# Patient Record
Sex: FEMALE | Race: BLACK OR AFRICAN AMERICAN | Employment: OTHER | URBAN - METROPOLITAN AREA
[De-identification: names, ages, dates, MRNs, and addresses within clinical notes are randomized per-mention and may not be internally consistent; named-entity substitution may affect disease eponyms.]

---

## 2017-11-21 PROBLEM — J96.00 ACUTE RESPIRATORY FAILURE (HCC): Status: ACTIVE | Noted: 2017-11-21

## 2017-11-22 ENCOUNTER — TELEPHONE (OUTPATIENT)
Dept: PULMONOLOGY | Age: 72
End: 2017-11-22

## 2017-11-22 NOTE — TELEPHONE ENCOUNTER
Phoned pt to schedule hospital f/u appt the week of February 19th. Patient will have her daughter call us to schedule appt. Patient stated that daughter is the one who brings her to appts so it will have to be on her schedule.

## 2017-11-22 NOTE — TELEPHONE ENCOUNTER
----- Message from Owen Bah MD sent at 11/22/2017  9:24 AM EST -----  Please schedule f/u with us in 1-2 months. Thanks.

## 2017-12-14 ENCOUNTER — OFFICE VISIT (OUTPATIENT)
Dept: PULMONOLOGY | Age: 72
End: 2017-12-14

## 2017-12-14 VITALS
HEIGHT: 64 IN | BODY MASS INDEX: 38.07 KG/M2 | OXYGEN SATURATION: 94 % | RESPIRATION RATE: 16 BRPM | WEIGHT: 223 LBS | DIASTOLIC BLOOD PRESSURE: 62 MMHG | HEART RATE: 69 BPM | SYSTOLIC BLOOD PRESSURE: 110 MMHG

## 2017-12-14 DIAGNOSIS — J44.9 MODERATE COPD (CHRONIC OBSTRUCTIVE PULMONARY DISEASE) (HCC): Primary | ICD-10-CM

## 2017-12-14 DIAGNOSIS — Z12.2 ENCOUNTER FOR SCREENING FOR LUNG CANCER: ICD-10-CM

## 2017-12-14 DIAGNOSIS — F17.200 TOBACCO USE DISORDER: ICD-10-CM

## 2017-12-14 DIAGNOSIS — J42 CHRONIC BRONCHITIS, UNSPECIFIED CHRONIC BRONCHITIS TYPE (HCC): ICD-10-CM

## 2017-12-14 PROCEDURE — 3023F SPIROM DOC REV: CPT | Performed by: INTERNAL MEDICINE

## 2017-12-14 PROCEDURE — 1123F ACP DISCUSS/DSCN MKR DOCD: CPT | Performed by: INTERNAL MEDICINE

## 2017-12-14 PROCEDURE — G8427 DOCREV CUR MEDS BY ELIG CLIN: HCPCS | Performed by: INTERNAL MEDICINE

## 2017-12-14 PROCEDURE — 1090F PRES/ABSN URINE INCON ASSESS: CPT | Performed by: INTERNAL MEDICINE

## 2017-12-14 PROCEDURE — G8484 FLU IMMUNIZE NO ADMIN: HCPCS | Performed by: INTERNAL MEDICINE

## 2017-12-14 PROCEDURE — 3014F SCREEN MAMMO DOC REV: CPT | Performed by: INTERNAL MEDICINE

## 2017-12-14 PROCEDURE — G8400 PT W/DXA NO RESULTS DOC: HCPCS | Performed by: INTERNAL MEDICINE

## 2017-12-14 PROCEDURE — 3017F COLORECTAL CA SCREEN DOC REV: CPT | Performed by: INTERNAL MEDICINE

## 2017-12-14 PROCEDURE — 4040F PNEUMOC VAC/ADMIN/RCVD: CPT | Performed by: INTERNAL MEDICINE

## 2017-12-14 PROCEDURE — G8417 CALC BMI ABV UP PARAM F/U: HCPCS | Performed by: INTERNAL MEDICINE

## 2017-12-14 PROCEDURE — 99214 OFFICE O/P EST MOD 30 MIN: CPT | Performed by: INTERNAL MEDICINE

## 2017-12-14 PROCEDURE — G8926 SPIRO NO PERF OR DOC: HCPCS | Performed by: INTERNAL MEDICINE

## 2017-12-14 PROCEDURE — 4004F PT TOBACCO SCREEN RCVD TLK: CPT | Performed by: INTERNAL MEDICINE

## 2017-12-14 PROCEDURE — 1111F DSCHRG MED/CURRENT MED MERGE: CPT | Performed by: INTERNAL MEDICINE

## 2017-12-14 RX ORDER — BUPROPION HYDROCHLORIDE 150 MG/1
150 TABLET ORAL
COMMUNITY
Start: 2017-12-12 | End: 2018-01-09

## 2017-12-14 ASSESSMENT — ENCOUNTER SYMPTOMS
ABDOMINAL DISTENTION: 0
WHEEZING: 0
COUGH: 0
SHORTNESS OF BREATH: 1
CHEST TIGHTNESS: 0
EYE REDNESS: 0

## 2017-12-14 NOTE — LETTER
[address]  [address] [facility]  [address] [place] [address]  Phone: [phone]  Fax: [phone]    Edvin Benedict MD        December 14, 2017     [facility] [address]  [place], [address]    Patient: Derrick Engle   MR Number: [id number]   YOB: 1945   Date of Visit: 12/14/2017       Dear Dr. Crisostomo Cough: Thank you for referring Dwain Pathak to me for evaluation. Below are the relevant portions of my assessment and plan of care. If you have questions, please do not hesitate to call me. I look forward to following Aura Meeks along with you.     Sincerely,        Edvin Benedict MD

## 2017-12-14 NOTE — PROGRESS NOTES
[facility] Pulmonary and Critical Care    Outpatient Note    Subjective:   CHIEF COMPLAINT:   Chief Complaint   Patient presents with    Follow-Up from [facility] Other     pt started back smoking        HPI:     The patient is 71 y.o. female who presents today for Hospital follow up on COPD. She is known to have COPD and was admitted to the hospital on 11/21/17 for COPD exacerbation. On initial presentation she had VBG with pH of 7.2 and PCO2 of 71. She was treated with Solu-Medrol and azithromycin in addition to doing and breathing treatments. Repeat ABG was within normal pH and normal CO2. (PH 7.37 PCO2 41). Unfortunately she started smoking again after discharge. Currently she denied any cough or shortness of breath. She is using the [facility] twice a day. She is not requiring to use DuoNeb's. Past Medical History:    Past Medical History:   Diagnosis Date    Callus of foot     Depression     Fibromyalgia     Hypertension        Social History:    History   Smoking Status    Current Every Day Smoker    Packs/day: 1.00    Years: 30.00    Types: Cigarettes    Last attempt to quit: 6/9/2013   Smokeless Tobacco    Never Used       Family History:  Family History   Problem Relation Age of Onset    Hypertension Father     Stroke Father     Hypertension Mother     Stroke Mother     Hypertension Sister     Diabetes Sister     Cancer Sister     Hypertension Brother     Diabetes Brother     Stroke Brother     Cancer Brother        Current Medications:  Current Outpatient Prescriptions on File Prior to Visit   Medication Sig Dispense Refill    levETIRAcetam (KEPPRA) 1000 MG tablet Take 2 tablets by mouth 2 times daily 60 tablet 3    DULoxetine (CYMBALTA) 60 MG extended release capsule Take 1 capsule by mouth nightly 30 capsule 2    lacosamide (VIMPAT) 50 MG TABS tablet Take 1 tablet by mouth 2 times daily .  60 tablet 2    ipratropium-albuterol (DUONEB) 0.5-2.5 (3) MG/3ML for smoking cessation    Spent over 25 minutes on this visit including history, physical exam, review of data, development and implementation of treatment plan and coordination of care  Over 50% of time spent in pt counseling and education.

## 2017-12-19 RX ORDER — MOMETASONE FUROATE AND FORMOTEROL FUMARATE DIHYDRATE 200; 5 UG/1; UG/1
AEROSOL RESPIRATORY (INHALATION)
Qty: 39 G | Refills: 5 | Status: SHIPPED | OUTPATIENT
Start: 2017-12-19

## 2018-01-09 ENCOUNTER — OFFICE VISIT (OUTPATIENT)
Dept: PULMONOLOGY | Age: 73
End: 2018-01-09

## 2018-01-09 VITALS
HEIGHT: 64 IN | BODY MASS INDEX: 38.07 KG/M2 | HEART RATE: 60 BPM | OXYGEN SATURATION: 98 % | WEIGHT: 223 LBS | RESPIRATION RATE: 16 BRPM

## 2018-01-09 DIAGNOSIS — J44.9 MODERATE COPD (CHRONIC OBSTRUCTIVE PULMONARY DISEASE) (HCC): Primary | ICD-10-CM

## 2018-01-09 DIAGNOSIS — F17.200 TOBACCO USE DISORDER: ICD-10-CM

## 2018-01-09 PROCEDURE — 1090F PRES/ABSN URINE INCON ASSESS: CPT | Performed by: INTERNAL MEDICINE

## 2018-01-09 PROCEDURE — 3014F SCREEN MAMMO DOC REV: CPT | Performed by: INTERNAL MEDICINE

## 2018-01-09 PROCEDURE — 3023F SPIROM DOC REV: CPT | Performed by: INTERNAL MEDICINE

## 2018-01-09 PROCEDURE — 1123F ACP DISCUSS/DSCN MKR DOCD: CPT | Performed by: INTERNAL MEDICINE

## 2018-01-09 PROCEDURE — G8400 PT W/DXA NO RESULTS DOC: HCPCS | Performed by: INTERNAL MEDICINE

## 2018-01-09 PROCEDURE — G8926 SPIRO NO PERF OR DOC: HCPCS | Performed by: INTERNAL MEDICINE

## 2018-01-09 PROCEDURE — 3017F COLORECTAL CA SCREEN DOC REV: CPT | Performed by: INTERNAL MEDICINE

## 2018-01-09 PROCEDURE — 99214 OFFICE O/P EST MOD 30 MIN: CPT | Performed by: INTERNAL MEDICINE

## 2018-01-09 PROCEDURE — 4040F PNEUMOC VAC/ADMIN/RCVD: CPT | Performed by: INTERNAL MEDICINE

## 2018-01-09 PROCEDURE — 4004F PT TOBACCO SCREEN RCVD TLK: CPT | Performed by: INTERNAL MEDICINE

## 2018-01-09 PROCEDURE — G8484 FLU IMMUNIZE NO ADMIN: HCPCS | Performed by: INTERNAL MEDICINE

## 2018-01-09 PROCEDURE — G8427 DOCREV CUR MEDS BY ELIG CLIN: HCPCS | Performed by: INTERNAL MEDICINE

## 2018-01-09 PROCEDURE — G8417 CALC BMI ABV UP PARAM F/U: HCPCS | Performed by: INTERNAL MEDICINE

## 2018-01-09 ASSESSMENT — ENCOUNTER SYMPTOMS
ABDOMINAL DISTENTION: 0
CHEST TIGHTNESS: 0
EYE REDNESS: 0
WHEEZING: 0
SHORTNESS OF BREATH: 1
COUGH: 0

## 2018-01-09 NOTE — PROGRESS NOTES
extensively for smoking cessation. discontinue Wellbutrin  okay to use nicotine patches. Advised to start with 21 mg for one week then 14 mg for 1 week then 7 mg for one week   get PFT   get low-dose CT   encouraged to participate in pulmonary rehab   up-to-date on immunization    Spent over 25 minutes on this visit including history, physical exam, review of data, development and implementation of treatment plan and coordination of care  Over 50% of time spent in pt counseling and education.

## 2019-05-10 ENCOUNTER — ANESTHESIA (OUTPATIENT)
Dept: ENDOSCOPY | Age: 74
End: 2019-05-10
Payer: MEDICARE

## 2019-05-10 ENCOUNTER — HOSPITAL ENCOUNTER (OUTPATIENT)
Age: 74
Setting detail: OUTPATIENT SURGERY
Discharge: HOME | End: 2019-05-10
Attending: INTERNAL MEDICINE | Admitting: INTERNAL MEDICINE
Payer: MEDICARE

## 2019-05-10 ENCOUNTER — ANESTHESIA EVENT (OUTPATIENT)
Dept: ENDOSCOPY | Age: 74
End: 2019-05-10
Payer: MEDICARE

## 2019-05-10 VITALS
SYSTOLIC BLOOD PRESSURE: 102 MMHG | BODY MASS INDEX: 39.51 KG/M2 | HEART RATE: 69 BPM | HEIGHT: 63 IN | WEIGHT: 223 LBS | RESPIRATION RATE: 21 BRPM | TEMPERATURE: 98.6 F | DIASTOLIC BLOOD PRESSURE: 79 MMHG | OXYGEN SATURATION: 95 %

## 2019-05-10 VITALS — DIASTOLIC BLOOD PRESSURE: 77 MMHG | OXYGEN SATURATION: 99 % | SYSTOLIC BLOOD PRESSURE: 123 MMHG | TEMPERATURE: 96.8 F

## 2019-05-10 PROCEDURE — 88305 TISSUE EXAM BY PATHOLOGIST: CPT

## 2019-05-10 PROCEDURE — 3609012400 HC EGD TRANSORAL BIOPSY SINGLE/MULTIPLE: Performed by: INTERNAL MEDICINE

## 2019-05-10 PROCEDURE — 6360000002 HC RX W HCPCS: Performed by: NURSE ANESTHETIST, CERTIFIED REGISTERED

## 2019-05-10 PROCEDURE — 7100000010 HC PHASE II RECOVERY - FIRST 15 MIN: Performed by: INTERNAL MEDICINE

## 2019-05-10 PROCEDURE — 6360000002 HC RX W HCPCS: Performed by: ANESTHESIOLOGY

## 2019-05-10 PROCEDURE — 94664 DEMO&/EVAL PT USE INHALER: CPT

## 2019-05-10 PROCEDURE — 3700000000 HC ANESTHESIA ATTENDED CARE: Performed by: INTERNAL MEDICINE

## 2019-05-10 PROCEDURE — 2709999900 HC NON-CHARGEABLE SUPPLY: Performed by: INTERNAL MEDICINE

## 2019-05-10 PROCEDURE — 3700000001 HC ADD 15 MINUTES (ANESTHESIA): Performed by: INTERNAL MEDICINE

## 2019-05-10 PROCEDURE — 2580000003 HC RX 258: Performed by: NURSE ANESTHETIST, CERTIFIED REGISTERED

## 2019-05-10 PROCEDURE — 94640 AIRWAY INHALATION TREATMENT: CPT

## 2019-05-10 PROCEDURE — 2780000010 HC IMPLANT OTHER: Performed by: INTERNAL MEDICINE

## 2019-05-10 PROCEDURE — 7100000011 HC PHASE II RECOVERY - ADDTL 15 MIN: Performed by: INTERNAL MEDICINE

## 2019-05-10 PROCEDURE — 2500000003 HC RX 250 WO HCPCS: Performed by: NURSE ANESTHETIST, CERTIFIED REGISTERED

## 2019-05-10 PROCEDURE — 3609010200 HC COLONOSCOPY ABLATION TUMOR POLYP/OTHER LES: Performed by: INTERNAL MEDICINE

## 2019-05-10 PROCEDURE — 3609009900 HC COLONOSCOPY W/CONTROL BLEEDING ANY METHOD: Performed by: INTERNAL MEDICINE

## 2019-05-10 PROCEDURE — 3609010300 HC COLONOSCOPY W/BIOPSY SINGLE/MULTIPLE: Performed by: INTERNAL MEDICINE

## 2019-05-10 PROCEDURE — 94761 N-INVAS EAR/PLS OXIMETRY MLT: CPT

## 2019-05-10 DEVICE — CLIP LIG L235CM RESOL 360 BX/20: Type: IMPLANTABLE DEVICE | Site: ABDOMEN | Status: FUNCTIONAL

## 2019-05-10 RX ORDER — PROPOFOL 10 MG/ML
INJECTION, EMULSION INTRAVENOUS PRN
Status: DISCONTINUED | OUTPATIENT
Start: 2019-05-10 | End: 2019-05-10 | Stop reason: SDUPTHER

## 2019-05-10 RX ORDER — FENTANYL CITRATE 50 UG/ML
25 INJECTION, SOLUTION INTRAMUSCULAR; INTRAVENOUS EVERY 5 MIN PRN
Status: CANCELLED | OUTPATIENT
Start: 2019-05-10

## 2019-05-10 RX ORDER — SPIRONOLACTONE 50 MG/1
50 TABLET, FILM COATED ORAL DAILY
COMMUNITY
Start: 2019-02-07

## 2019-05-10 RX ORDER — ALBUTEROL SULFATE 2.5 MG/3ML
2.5 SOLUTION RESPIRATORY (INHALATION) ONCE
Status: COMPLETED | OUTPATIENT
Start: 2019-05-10 | End: 2019-05-10

## 2019-05-10 RX ORDER — ONDANSETRON 2 MG/ML
INJECTION INTRAMUSCULAR; INTRAVENOUS PRN
Status: DISCONTINUED | OUTPATIENT
Start: 2019-05-10 | End: 2019-05-10 | Stop reason: SDUPTHER

## 2019-05-10 RX ORDER — PROPOFOL 10 MG/ML
INJECTION, EMULSION INTRAVENOUS CONTINUOUS PRN
Status: DISCONTINUED | OUTPATIENT
Start: 2019-05-10 | End: 2019-05-10 | Stop reason: SDUPTHER

## 2019-05-10 RX ORDER — AZITHROMYCIN 250 MG/1
250 TABLET, FILM COATED ORAL
COMMUNITY
Start: 2019-05-01

## 2019-05-10 RX ORDER — POLYETHYLENE GLYCOL 3350 17 G/17G
17 POWDER, FOR SOLUTION ORAL 2 TIMES DAILY
COMMUNITY

## 2019-05-10 RX ORDER — SODIUM CHLORIDE, SODIUM LACTATE, POTASSIUM CHLORIDE, CALCIUM CHLORIDE 600; 310; 30; 20 MG/100ML; MG/100ML; MG/100ML; MG/100ML
INJECTION, SOLUTION INTRAVENOUS CONTINUOUS PRN
Status: DISCONTINUED | OUTPATIENT
Start: 2019-05-10 | End: 2019-05-10 | Stop reason: SDUPTHER

## 2019-05-10 RX ORDER — GLYCOPYRROLATE 0.2 MG/ML
INJECTION INTRAMUSCULAR; INTRAVENOUS PRN
Status: DISCONTINUED | OUTPATIENT
Start: 2019-05-10 | End: 2019-05-10 | Stop reason: SDUPTHER

## 2019-05-10 RX ORDER — FENTANYL CITRATE 50 UG/ML
50 INJECTION, SOLUTION INTRAMUSCULAR; INTRAVENOUS EVERY 5 MIN PRN
Status: CANCELLED | OUTPATIENT
Start: 2019-05-10

## 2019-05-10 RX ADMIN — PROPOFOL: 10 INJECTION, EMULSION INTRAVENOUS at 14:37

## 2019-05-10 RX ADMIN — PROPOFOL 75 MCG/KG/MIN: 10 INJECTION, EMULSION INTRAVENOUS at 13:58

## 2019-05-10 RX ADMIN — ALBUTEROL SULFATE 2.5 MG: 2.5 SOLUTION RESPIRATORY (INHALATION) at 11:46

## 2019-05-10 RX ADMIN — SODIUM CHLORIDE, SODIUM LACTATE, POTASSIUM CHLORIDE, AND CALCIUM CHLORIDE: 600; 310; 30; 20 INJECTION, SOLUTION INTRAVENOUS at 13:47

## 2019-05-10 RX ADMIN — GLYCOPYRROLATE 0.1 MG: 0.2 INJECTION, SOLUTION INTRAMUSCULAR; INTRAVENOUS at 13:58

## 2019-05-10 RX ADMIN — ONDANSETRON 4 MG: 2 INJECTION INTRAMUSCULAR; INTRAVENOUS at 14:37

## 2019-05-10 RX ADMIN — PROPOFOL 50 MG: 10 INJECTION, EMULSION INTRAVENOUS at 13:58

## 2019-05-10 ASSESSMENT — PULMONARY FUNCTION TESTS
PIF_VALUE: 0
PIF_VALUE: 1
PIF_VALUE: 0

## 2019-05-10 ASSESSMENT — PAIN - FUNCTIONAL ASSESSMENT
PAIN_FUNCTIONAL_ASSESSMENT: 0-10

## 2019-05-10 ASSESSMENT — ENCOUNTER SYMPTOMS
SHORTNESS OF BREATH: 1
SHORTNESS OF BREATH: 1

## 2019-05-10 ASSESSMENT — LIFESTYLE VARIABLES: SMOKING_STATUS: 1

## 2019-05-10 NOTE — ANESTHESIA PRE PROCEDURE
Department of Anesthesiology  Preprocedure Note       Name:  Abby Meng   Age:  69 y.o.  :  1945                                          MRN:  [id number]         Date:  5/10/2019      Surgeon: Jeanna Byrd):  Sybil Donahue MD    Procedure: ESOPHAGOGASTRODUODENOSCOPY WITH MAC (N/A )  COLONOSCOPY (N/A )    Medications prior to admission:   Prior to Admission medications    Medication Sig Start Date End Date Taking? Authorizing Provider   azithromycin (ZITHROMAX) 250 MG tablet Take 250 mg by mouth three times a week MWF 19  Yes Historical Provider, MD   spironolactone (ALDACTONE) 50 MG tablet Take 50 mg by mouth daily 19  Yes Historical Provider, MD   [place] Rater 200-5 MCG/ACT inhaler INHALE 2 PUFFS INTO THE LUNGS TWICE DAILY 17  Yes Jose Aggarwal MD   levETIRAcetam (KEPPRA) 1000 MG tablet Take 2 tablets by mouth 2 times daily 17  Yes Yovani Wilde MD   DULoxetine (CYMBALTA) 60 MG extended release capsule Take 1 capsule by mouth nightly 17  Yes Yovani Wilde MD   ipratropium-albuterol (DUONEB) 0.5-2.5 (3) MG/3ML SOLN nebulizer solution Inhale 3 mLs into the lungs every 4 hours 17  Yes Yovani Wilde MD   Humidifier MISC 1 Units by Does not apply route daily 17  Yes Yovani Wilde MD   albuterol sulfate  (90 Base) MCG/ACT inhaler Inhale 2 puffs into the lungs every 6 hours as needed for Wheezing   Yes Historical Provider, MD   nortriptyline (PAMELOR) 10 MG capsule Take 10 mg by mouth nightly 17  Yes Historical Provider, MD   Cyanocobalamin (VITAMIN B 12 PO) Take 2,000 mg by mouth nightly   Yes Historical Provider, MD   Cholecalciferol (VITAMIN D3) 3000 units TABS Take 2,000 Units by mouth nightly   Yes Historical Provider, MD   pregabalin (LYRICA) 150 MG capsule Take 1 capsule by mouth 2 times daily. 13  Yes Marva Scott MD   simvastatin (ZOCOR) 20 MG tablet Take 1 tablet by mouth every evening.  13  Yes Marva Scott MD   aspirin 325 MG EC Positive 06/06/2013       Anesthesia Evaluation  Patient summary reviewed and Nursing notes reviewed  Airway: Mallampati: III  TM distance: >3 FB   Neck ROM: full  Mouth opening: > = 3 FB Dental:          Pulmonary:Negative Pulmonary ROS   (+) shortness of breath:  decreased breath sounds,  wheezes,  current smoker                           Cardiovascular:Negative CV ROS                      Neuro/Psych:   Negative Neuro/Psych ROS              GI/Hepatic/Renal: Neg GI/Hepatic/Renal ROS            Endo/Other: Negative Endo/Other ROS                    Abdominal:           Vascular: negative vascular ROS. Anesthesia Plan      MAC     ASA 3       Induction: intravenous. MIPS: Postoperative opioids intended and Prophylactic antiemetics administered. Anesthetic plan and risks discussed with patient.         Attending anesthesiologist reviewed and agrees with Kamilla Chambers MD   5/10/2019

## 2019-05-10 NOTE — ANESTHESIA POSTPROCEDURE EVALUATION
Department of Anesthesiology  Postprocedure Note    Patient: Emile Espinoza  MRN: [id number]  YOB: 1945  Date of evaluation: 5/10/2019  Time:  6:11 PM     Procedure Summary     Date:  05/10/19 Room / Location:  [facility] ENDO 03 / [facility] ENDOSCOPY    Anesthesia Start:  8392 Anesthesia Stop:  7393    Procedures:       EGD BIOPSY (N/A )      COLONOSCOPY WITH BIOPSY (N/A )      COLONOSCOPY POLYPECTOMY ABLATION      COLONOSCOPY CONTROL HEMORRHAGE HEMOCLIP X 1 @ 55CM POLYP SITE Diagnosis:  (ABDOMINAL PAIN  R10.9, COLON SCREENIG  Z12.11)    Surgeon:  Irena Workman MD Responsible Provider:  Sobia Dubon MD    Anesthesia Type:  MAC ASA Status:  3          Anesthesia Type: MAC    Felicitas Phase I: Felicitas Score: 9    Felicitas Phase II: Felicitas Score: 9    Last vitals: Reviewed and per EMR flowsheets.        Anesthesia Post Evaluation

## 2019-05-10 NOTE — PROGRESS NOTES
Aleksey Szymanski is a 69 y.o. female patient. Current Facility-Administered Medications   Medication Dose Route Frequency Provider Last Rate Last Dose    albuterol (PROVENTIL) nebulizer solution 2.5 mg  2.5 mg Nebulization Once Clau Blackwell MD         Allergies   Allergen Reactions    Adhesive Tape Hives    Penicillins Shortness Of Breath    Percocet [Oxycodone-Acetaminophen] Swelling, Anxiety and Other (See Comments)    Tetanus Toxoids Swelling    Sulfa Antibiotics Rash     Active Problems:    * No active hospital problems. *  Resolved Problems:    * No resolved hospital problems. *    Blood pressure 118/65, pulse 78, temperature 98.6 °F (37 °C), temperature source Oral, resp. rate 22, height 5' 3\" (1.6 m), weight 223 lb (101.2 kg), SpO2 93 %, not currently breastfeeding. Subjective:  Symptoms:  Improved. She reports shortness of breath. (Upon arrival patient had VANG. She had albuterol inhaler in her hand and was getting ready to use it. Once sitting on side of bed she became less SOB. Dr. Coty Mahoney in to evaluate patient at bedside. RT notified to provide breathing treatment to patient. Denies GI symptoms at present. ). Diet:  Adequate intake. Activity level: Impaired due to weakness. Pain:  She reports no pain. Objective:  General Appearance:  Well-appearing and comfortable. Vital signs: (most recent): Blood pressure 118/65, pulse 78, temperature 98.6 °F (37 °C), temperature source Oral, resp. rate 22, height 5' 3\" (1.6 m), weight 223 lb (101.2 kg), SpO2 93 %, not currently breastfeeding. Vital signs are normal.    Output: Producing urine and producing stool. Lungs:  Increased effort. Heart: Normal rate. Abdomen: Abdomen is soft. There is no abdominal tenderness. Extremities: Decreased range of motion. Neurological: Patient is alert and oriented to person, place and time. Skin:  Warm and dry. Assessment:    Condition: In stable condition. Janeen Burgess Docker  5/10/2019

## 2019-05-10 NOTE — H&P
History and Physical / Pre-Sedation Assessment    Patient:  Delmi Hernandez   :   1945     Intended Procedure:  egd and colonoscopy    HPI: abdominal pain. Nausea, unexplained constipation    Past Medical History:   has a past medical history of Callus of foot, cane, COPD (chronic obstructive pulmonary disease) ([address] Depression, Fibromyalgia, and Hypertension. Past Surgical History:   has a past surgical history that includes Spinal fusion (); lumbar discectomy (); Colonoscopy (); and Total knee arthroplasty (Right, ). Medications:  Prior to Admission medications    Medication Sig Start Date End Date Taking?  Authorizing Provider   azithromycin (ZITHROMAX) 250 MG tablet Take 250 mg by mouth three times a week MWF 19  Yes Historical Provider, MD   spironolactone (ALDACTONE) 50 MG tablet Take 50 mg by mouth daily 19  Yes Historical Provider, MD   polyethylene glycol (MIRALAX) powder Take 17 g by mouth 2 times daily   Yes Historical Provider, MD   DULERA 200-5 MCG/ACT inhaler INHALE 2 PUFFS INTO THE LUNGS TWICE DAILY 17  Yes Jose Aggarwal MD   levETIRAcetam (KEPPRA) 1000 MG tablet Take 2 tablets by mouth 2 times daily 17  Yes Mika Deshpande MD   DULoxetine (CYMBALTA) 60 MG extended release capsule Take 1 capsule by mouth nightly 17  Yes Mika Deshpande MD   ipratropium-albuterol (DUONEB) 0.5-2.5 (3) MG/3ML SOLN nebulizer solution Inhale 3 mLs into the lungs every 4 hours 17  Yes Mika Deshpande MD   Humidifier MISC 1 Units by Does not apply route daily 17  Yes Mika Deshpande MD   albuterol sulfate  (90 Base) MCG/ACT inhaler Inhale 2 puffs into the lungs every 6 hours as needed for Wheezing   Yes Historical Provider, MD   nortriptyline (PAMELOR) 10 MG capsule Take 10 mg by mouth nightly 17  Yes Historical Provider, MD   Cyanocobalamin (VITAMIN B 12 PO) Take 2,000 mg by mouth nightly   Yes Historical Provider, MD   Cholecalciferol (VITAMIN D3) 3000 units TABS Take 2,000 Units by mouth nightly   Yes Historical Provider, MD   pregabalin (LYRICA) 150 MG capsule Take 1 capsule by mouth 2 times daily. 8/2/13  Yes Teresita Logan MD   simvastatin (ZOCOR) 20 MG tablet Take 1 tablet by mouth every evening. 5/2/13  Yes Teresita Logan MD   aspirin 325 MG EC tablet Take 81 mg by mouth daily    Yes Historical Provider, MD   lacosamide (VIMPAT) 50 MG TABS tablet Take 1 tablet by mouth 2 times daily . 11/23/17   Raya Downing MD   predniSONE (DELTASONE) 10 MG tablet 4 tablets once daily for 3 days then 3 tablets once daily for 3 days then 2 tablets once daily for 3 days then 1 tablet once daily for 3 days. 11/22/17   Raya Downing MD   senna-docusate (SENNA S) 8.6-50 MG per tablet Take 1 tablet by mouth daily 11/2/17   Historical Provider, MD       Family History:  family history includes Cancer in her brother and sister; Diabetes in her brother and sister; Hypertension in her brother, father, mother, and sister; Stroke in her brother, father, and mother. Social History:   reports that she has been smoking cigarettes. She has a 30.00 pack-year smoking history. She has never used smokeless tobacco. She reports that she does not drink alcohol or use drugs. Allergies:  Adhesive tape; Penicillins; Percocet [oxycodone-acetaminophen]; Tetanus toxoids; and Sulfa antibiotics    Nurses notes reviewed and agreed.   Medications reviewed    Physical Exam:  Vital Signs: BP (!) 149/79   Pulse 78   Temp 98.6 °F (37 °C) (Oral)   Resp 22   Ht 5' 3\" (1.6 m)   Wt 223 lb (101.2 kg)   SpO2 100%   BMI 39.50 kg/m²    Pulmonary:Normal  Cardiac:Normal  Abdomen:Normal  Neuro : normal    Pre-Procedure Assessment / Plan:  ASA 2 - Patient with mild systemic disease with no functional limitations  Mallampati Airway Assessment:  Mallampati Class III - (soft palate & base of uvula are visible)  Level of Sedation Plan:Mac sedation  Post Procedure plan: Return to same level of

## 2019-05-11 NOTE — OP NOTE
[address], [address]                                OPERATIVE REPORT    PATIENT NAME: Yvonne Bullard                :        1945  MED REC NO:   [id number]                          ROOM:  ACCOUNT NO:   [de-identified]                           ADMIT DATE: 05/10/2019  PROVIDER:     Rena Mayfield MD    DATE OF PROCEDURE:  05/10/2019    SURGEON:  Rena Mayfield MD    INDICATION FOR PROCEDURE:  A 25-year-old woman who presented with  abdominal pain, nausea, recurrent heartburn, and unexplained  constipation. Family history is significant for colon cancer and breast  cancer. Her last colonoscopy was over 15 years ago. DESCRIPTION OF PROCEDURE:  EGD:  With the patient in the left lateral position and after sedation  with IV Diprivan, the Olympus video endoscope was introduced into the  esophagus and advanced toward the GE junction. The esophagus was  normal.  The stomach was carefully inspected. Mild gastritis was seen  and biopsies were obtained for Helicobacter pylori. The duodenum was  unremarkable. The scope was then removed without complication. COLONOSCOPY:  The Olympus video colonoscope was then inserted into the  rectum and carefully advanced to the cecum. A small polyp noted in the  ascending colon, which we removed with the biopsy forceps. A similar  polyp was noted in the transverse colon, which we also removed with the  biopsy forceps. In the descending colon, there was about a 2 cm polyp  noted on the stalk. This polyp was removed with polypectomy snare  technique. The site was then clipped using Resolution clip, to prevent  bleeding. The quality of the preparation was rather poor, and therefore  the significant stool was noted throughout the colon and this has made  it not possible to perform an ideal inspection and also unable to  retrieve the polyp.   The patient had significant contraction for which  she got 2 mg of glucagon IV at 1 mg each and she also received 0.2 mg of  Robinul by nurse anesthetist.  The scope was then removed without  complications. IMPRESSION:  1. Mild gastritis. 2.  Ascending colon polyp. 3.  Transverse colon polyp. 4.  Descending colon polyp. 5.  Poorly prepared colon. Thank you Dr. Elodia Mendez for your kind referral of the patient. Farhan Patten MD    D: 05/10/2019 15:08:07       T: 05/10/2019 21:51:40     [email or web address]  Job#: [id number]     Doc#: [id number]    CC:   Willow Rosales MD       Lendell Groom

## 2022-10-06 ENCOUNTER — APPOINTMENT (OUTPATIENT)
Dept: URBAN - METROPOLITAN AREA CLINIC 273 | Age: 77
Setting detail: DERMATOLOGY
End: 2022-10-06

## 2022-10-06 DIAGNOSIS — L73.2 HIDRADENITIS SUPPURATIVA: ICD-10-CM

## 2022-10-06 PROCEDURE — 99204 OFFICE O/P NEW MOD 45 MIN: CPT

## 2022-10-06 PROCEDURE — OTHER COUNSELING: OTHER

## 2022-10-06 PROCEDURE — OTHER PRESCRIPTION: OTHER

## 2022-10-06 RX ORDER — METFORMIN HYDROCHLORIDE 500 MG/1
TABLET, FILM COATED ORAL
Qty: 90 | Refills: 3 | Status: ERX | COMMUNITY
Start: 2022-10-06

## 2022-10-06 RX ORDER — DOXYCYCLINE HYCLATE 100 MG/1
CAPSULE, GELATIN COATED ORAL
Qty: 180 | Refills: 1 | Status: ERX | COMMUNITY
Start: 2022-10-06

## 2022-11-03 ENCOUNTER — RX ONLY (RX ONLY)
Age: 77
End: 2022-11-03

## 2022-11-03 RX ORDER — FLUCONAZOLE 150 MG/1
TABLET ORAL
Qty: 3 | Refills: 11 | Status: ERX | COMMUNITY
Start: 2022-11-03

## 2022-12-15 ENCOUNTER — APPOINTMENT (OUTPATIENT)
Dept: URBAN - METROPOLITAN AREA CLINIC 273 | Age: 77
Setting detail: DERMATOLOGY
End: 2022-12-15

## 2022-12-15 DIAGNOSIS — L73.2 HIDRADENITIS SUPPURATIVA: ICD-10-CM

## 2022-12-15 PROCEDURE — OTHER PRESCRIPTION: OTHER

## 2022-12-15 PROCEDURE — 99214 OFFICE O/P EST MOD 30 MIN: CPT

## 2022-12-15 RX ORDER — CEPHALEXIN 500 MG/1
TABLET ORAL BID
Qty: 60 | Refills: 5 | Status: ERX | COMMUNITY
Start: 2022-12-15

## 2022-12-15 RX ORDER — CEPHALEXIN 500 MG/1
TABLET ORAL BID
Qty: 28 | Refills: 3 | Status: CANCELLED

## (undated) DEVICE — SINGLE-USE POLYPECTOMY SNARE: Brand: CAPTIVATOR

## (undated) DEVICE — TRAP SPEC RETRV CLR PLAS POLYP IN LN SUCT QUIK CTCH

## (undated) DEVICE — FORCEPS BX L240CM DIA2.4MM L NDL RAD JAW 4 133340